# Patient Record
Sex: FEMALE | Race: OTHER | NOT HISPANIC OR LATINO | URBAN - METROPOLITAN AREA
[De-identification: names, ages, dates, MRNs, and addresses within clinical notes are randomized per-mention and may not be internally consistent; named-entity substitution may affect disease eponyms.]

---

## 2021-03-12 ENCOUNTER — EMERGENCY (EMERGENCY)
Facility: HOSPITAL | Age: 61
LOS: 1 days | Discharge: HOME | End: 2021-03-12
Attending: EMERGENCY MEDICINE
Payer: MEDICAID

## 2021-03-12 VITALS
WEIGHT: 187.39 LBS | HEART RATE: 80 BPM | RESPIRATION RATE: 20 BRPM | HEIGHT: 64.96 IN | TEMPERATURE: 98 F | DIASTOLIC BLOOD PRESSURE: 81 MMHG | OXYGEN SATURATION: 97 % | SYSTOLIC BLOOD PRESSURE: 128 MMHG

## 2021-03-12 VITALS
RESPIRATION RATE: 18 BRPM | TEMPERATURE: 99 F | SYSTOLIC BLOOD PRESSURE: 130 MMHG | DIASTOLIC BLOOD PRESSURE: 85 MMHG | OXYGEN SATURATION: 100 % | HEART RATE: 57 BPM

## 2021-03-12 DIAGNOSIS — R06.02 SHORTNESS OF BREATH: ICD-10-CM

## 2021-03-12 LAB
ALBUMIN SERPL ELPH-MCNC: 4.5 G/DL — SIGNIFICANT CHANGE UP (ref 3.3–5)
ALP SERPL-CCNC: 69 U/L — SIGNIFICANT CHANGE UP (ref 40–120)
ALT FLD-CCNC: 12 U/L — SIGNIFICANT CHANGE UP (ref 10–45)
ANION GAP SERPL CALC-SCNC: 10 MMOL/L — SIGNIFICANT CHANGE UP (ref 5–17)
AST SERPL-CCNC: 19 U/L — SIGNIFICANT CHANGE UP (ref 10–40)
BASOPHILS # BLD AUTO: 0.05 K/UL — SIGNIFICANT CHANGE UP (ref 0–0.2)
BASOPHILS NFR BLD AUTO: 1.1 % — SIGNIFICANT CHANGE UP (ref 0–2)
BILIRUB SERPL-MCNC: 0.8 MG/DL — SIGNIFICANT CHANGE UP (ref 0.2–1.2)
BUN SERPL-MCNC: 13 MG/DL — SIGNIFICANT CHANGE UP (ref 7–23)
CALCIUM SERPL-MCNC: 9.8 MG/DL — SIGNIFICANT CHANGE UP (ref 8.4–10.5)
CHLORIDE SERPL-SCNC: 103 MMOL/L — SIGNIFICANT CHANGE UP (ref 96–108)
CO2 SERPL-SCNC: 26 MMOL/L — SIGNIFICANT CHANGE UP (ref 22–31)
CREAT SERPL-MCNC: 0.77 MG/DL — SIGNIFICANT CHANGE UP (ref 0.5–1.3)
D DIMER BLD IA.RAPID-MCNC: <150 NG/ML DDU — SIGNIFICANT CHANGE UP
EOSINOPHIL # BLD AUTO: 0.12 K/UL — SIGNIFICANT CHANGE UP (ref 0–0.5)
EOSINOPHIL NFR BLD AUTO: 2.5 % — SIGNIFICANT CHANGE UP (ref 0–6)
GLUCOSE SERPL-MCNC: 112 MG/DL — HIGH (ref 70–99)
HCT VFR BLD CALC: 41.3 % — SIGNIFICANT CHANGE UP (ref 34.5–45)
HGB BLD-MCNC: 13.4 G/DL — SIGNIFICANT CHANGE UP (ref 11.5–15.5)
LYMPHOCYTES # BLD AUTO: 1.7 K/UL — SIGNIFICANT CHANGE UP (ref 1–3.3)
LYMPHOCYTES # BLD AUTO: 35.8 % — SIGNIFICANT CHANGE UP (ref 13–44)
MCHC RBC-ENTMCNC: 29.1 PG — SIGNIFICANT CHANGE UP (ref 27–34)
MCHC RBC-ENTMCNC: 32.4 GM/DL — SIGNIFICANT CHANGE UP (ref 32–36)
MCV RBC AUTO: 89.8 FL — SIGNIFICANT CHANGE UP (ref 80–100)
MONOCYTES # BLD AUTO: 0.37 K/UL — SIGNIFICANT CHANGE UP (ref 0–0.9)
MONOCYTES NFR BLD AUTO: 7.8 % — SIGNIFICANT CHANGE UP (ref 2–14)
NEUTROPHILS # BLD AUTO: 2.51 K/UL — SIGNIFICANT CHANGE UP (ref 1.8–7.4)
NEUTROPHILS NFR BLD AUTO: 52.8 % — SIGNIFICANT CHANGE UP (ref 43–77)
NRBC # BLD: 0 /100 WBCS — SIGNIFICANT CHANGE UP (ref 0–0)
PLATELET # BLD AUTO: 286 K/UL — SIGNIFICANT CHANGE UP (ref 150–400)
POTASSIUM SERPL-MCNC: 4.3 MMOL/L — SIGNIFICANT CHANGE UP (ref 3.5–5.3)
POTASSIUM SERPL-SCNC: 4.3 MMOL/L — SIGNIFICANT CHANGE UP (ref 3.5–5.3)
PROT SERPL-MCNC: 7.4 G/DL — SIGNIFICANT CHANGE UP (ref 6–8.3)
RBC # BLD: 4.6 M/UL — SIGNIFICANT CHANGE UP (ref 3.8–5.2)
RBC # FLD: 12.3 % — SIGNIFICANT CHANGE UP (ref 10.3–14.5)
SARS-COV-2 RNA SPEC QL NAA+PROBE: DETECTED
SODIUM SERPL-SCNC: 139 MMOL/L — SIGNIFICANT CHANGE UP (ref 135–145)
TROPONIN T, HIGH SENSITIVITY RESULT: <6 NG/L — SIGNIFICANT CHANGE UP (ref 0–51)
WBC # BLD: 4.75 K/UL — SIGNIFICANT CHANGE UP (ref 3.8–10.5)
WBC # FLD AUTO: 4.75 K/UL — SIGNIFICANT CHANGE UP (ref 3.8–10.5)

## 2021-03-12 PROCEDURE — U0003: CPT

## 2021-03-12 PROCEDURE — 85379 FIBRIN DEGRADATION QUANT: CPT

## 2021-03-12 PROCEDURE — G1004: CPT

## 2021-03-12 PROCEDURE — 93005 ELECTROCARDIOGRAM TRACING: CPT

## 2021-03-12 PROCEDURE — 71045 X-RAY EXAM CHEST 1 VIEW: CPT | Mod: 26

## 2021-03-12 PROCEDURE — 71275 CT ANGIOGRAPHY CHEST: CPT

## 2021-03-12 PROCEDURE — 99284 EMERGENCY DEPT VISIT MOD MDM: CPT | Mod: 25

## 2021-03-12 PROCEDURE — 93010 ELECTROCARDIOGRAM REPORT: CPT

## 2021-03-12 PROCEDURE — 36000 PLACE NEEDLE IN VEIN: CPT | Mod: XU

## 2021-03-12 PROCEDURE — 71275 CT ANGIOGRAPHY CHEST: CPT | Mod: 26,ME

## 2021-03-12 PROCEDURE — 85025 COMPLETE CBC W/AUTO DIFF WBC: CPT

## 2021-03-12 PROCEDURE — 71045 X-RAY EXAM CHEST 1 VIEW: CPT

## 2021-03-12 PROCEDURE — 84484 ASSAY OF TROPONIN QUANT: CPT

## 2021-03-12 PROCEDURE — 99285 EMERGENCY DEPT VISIT HI MDM: CPT

## 2021-03-12 PROCEDURE — 80053 COMPREHEN METABOLIC PANEL: CPT

## 2021-03-12 PROCEDURE — U0005: CPT

## 2021-03-12 NOTE — CONSULT NOTE ADULT - SUBJECTIVE AND OBJECTIVE BOX
CHIEF COMPLAINT:    HISTORY OF PRESENT ILLNESS: 59 yo female  presents with translation by her doctor, Dr. Pedraza.  Patient reports suspected Covid infection in January and a persistent mild sob of for the last 2 months.  This occurs at rest and on exertion without change.  Patient also reports chest discomfort during this time.  No fever, no n/v, no cough.  Patient without any leg pain or complaints.  No bruising or bleeding    PAST MEDICAL & SURGICAL HISTORY:  No pertinent past medical history    No significant past surgical history            MEDICATIONS:                  FAMILY HISTORY:      SOCIAL HISTORY:    [ ] Non-smoker  [ ] Smoker  [ ] Alcohol    Allergies    cephalosporins (Rash)    Intolerances    	    REVIEW OF SYSTEMS:  CONSTITUTIONAL: No fever, weight loss, + fatigue  EYES: No eye pain, visual disturbances, or discharge  ENMT:  No difficulty hearing, tinnitus, vertigo; No sinus or throat pain  NECK: No pain or stiffness  RESPIRATORY: No cough, wheezing, chills or hemoptysis; + Shortness of Breath  CARDIOVASCULAR: No chest pain, palpitations, passing out, dizziness, or leg swelling  GASTROINTESTINAL: No abdominal or epigastric pain. No nausea, vomiting, or hematemesis; No diarrhea or constipation. No melena or hematochezia.  GENITOURINARY: No dysuria, frequency, hematuria, or incontinence  NEUROLOGICAL: No headaches, memory loss, loss of strength, numbness, or tremors  SKIN: No itching, burning, rashes, or lesions   LYMPH Nodes: No enlarged glands  ENDOCRINE: No heat or cold intolerance; No hair loss  MUSCULOSKELETAL: No joint pain or swelling; No muscle, back, or extremity pain  PSYCHIATRIC: No depression, anxiety, mood swings, or difficulty sleeping  HEME/LYMPH: No easy bruising, or bleeding gums  ALLERY AND IMMUNOLOGIC: No hives or eczema	    [ ] All others negative	  [ ] Unable to obtain    PHYSICAL EXAM:  T(C): 36.9 (03-12-21 @ 09:18), Max: 36.9 (03-12-21 @ 09:18)  HR: 80 (03-12-21 @ 09:18) (80 - 80)  BP: 128/81 (03-12-21 @ 09:18) (128/81 - 128/81)  RR: 20 (03-12-21 @ 09:18) (20 - 20)  SpO2: 97% (03-12-21 @ 09:18) (97% - 97%)  Wt(kg): --  I&O's Summary      Appearance: Normal	  HEENT:   Normal oral mucosa, PERRL, EOMI	  Lymphatic: No lymphadenopathy  Cardiovascular: Normal S1 S2, No JVD, No murmurs, No edema  Respiratory: Lungs clear to auscultation	  Psychiatry: A & O x 3, Mood & affect appropriate  Gastrointestinal:  Soft, Non-tender, + BS	  Skin: No rashes, No ecchymoses, No cyanosis	  Neurologic: Non-focal  Extremities: Normal range of motion, No clubbing, cyanosis or edema  Vascular: Peripheral pulses palpable 2+ bilaterally    TELEMETRY: 	 SR   ECG:  	NSR no acute ischemic stt changes    RADIOLOGY:  x< from: Xray Chest 1 View- PORTABLE-Urgent (03.12.21 @ 10:34) >  EXAM:  XR CHEST PORTABLE URGENT 1V                            PROCEDURE DATE:  03/12/2021            INTERPRETATION:  HISTORY: Chest pain and shortness of breath.    TECHNIQUE: A single AP view of the chest was obtained.    COMPARISON: None.    FINDINGS:  The cardiac silhouette is normal in size. There are no focal consolidations or pleural effusions. The hilar and mediastinal structures appear unremarkable. The osseous structures are intact.    IMPRESSION: Clear lungs.      < end of copied text >    OTHER: 	  	  LABS:	 	    CARDIAC MARKERS:        Troponin T, High Sensitivity Result: <6: Specimen not hemolyzed                           13.4   4.75  )-----------( 286      ( 12 Mar 2021 09:58 )             41.3     03-12    139  |  103  |  13  ----------------------------<  112<H>  4.3   |  26  |  0.77    Ca    9.8      12 Mar 2021 09:58    TPro  7.4  /  Alb  4.5  /  TBili  0.8  /  DBili  x   /  AST  19  /  ALT  12  /  AlkPhos  69  03-12    proBNP:   Lipid Profile:   HgA1c:   TSH:

## 2021-03-12 NOTE — ED PROVIDER NOTE - NS ED ROS FT
Review of Systems:  · Constitutional: no chills, no fever, no night sweats, no weight loss  · Nose: no epitaxis  · Mouth/Throat: no difficulty in swallowing, trachea midline, uvula midline  Cardiac:  Chest discomfort for 2 months with mild sob  · Respiratory: no cough,  no hemoptysis, no orthopnea,   · Gastrointestinal: no abdominal pain, no diarrhea, no melena, no nausea, no vomiting  · Genitourinary: no difficulty urinating, no dysuria, no hematuria  · MUSCULOSKELETAL: FROM of all extremities  · Skin: no abrasion; no bruising; no laceration  · Neurological: no change in level of consciousness, no headache, no seizures  · Psychiatric: no anxiety, no depression  · Endocrine: no excessive urination  · Heme/Lymph: no anemia, no easy bleeding  · Allergic/Immunologic: IMMUNIZATIONS UTD  · ROS STATEMENT: all other ROS negative except as per HPI

## 2021-03-12 NOTE — ED PROVIDER NOTE - CLINICAL SUMMARY MEDICAL DECISION MAKING FREE TEXT BOX
Patient presents with 2 months of mild SOB at rest and exertion.  Likely occurring or starting at time of a non-documented, suspected Covid infection.  Possible blood clot due to hypercoaguable state of Covid.  Will also evaluate ekg, troponin, labs, cxr, CTA.  Patient currently stable without pain, normal vital signs and patient is comfortable. Patient presents with 2 months of mild SOB at rest and exertion.  Likely occurring or starting at time of a non-documented, suspected Covid infection.  Possible blood clot due to hypercoaguable state of Covid.  Will also evaluate ekg, troponin, labs, cxr, CTA.  Patient currently stable without pain, normal vital signs and patient is comfortable.    Jarred:  I received a call from the cardiology fellow regarding the patient reporting that he is unable to perform a nuclear stress test on the patient today.  This is odd because I did not consult cardiology nor do I believe that a cardiac consult is indicated.  I saw an order in this chart for a nuclear stress test that was ordered by Dr. Jack.  I called Dr. Jack to his cell phone and office number (message left) to understand why a nuclear stress was ordered without a consult.  Will also discuss with Dr. Pedraza who brought the patient to the ED. Patient presents with 2 months of mild SOB at rest and exertion.  Likely occurring or starting at time of a non-documented, suspected Covid infection.  Possible blood clot due to hypercoaguable state of Covid.  Will also evaluate ekg, troponin, labs, cxr, CTA.  Patient currently stable without pain, normal vital signs and patient is comfortable.    Jarred:  I received a call from the cardiology fellow regarding the patient reporting that he is unable to perform a nuclear stress test on the patient today.  This is odd because I did not consult cardiology nor do I believe that a cardiac consult is indicated.  I saw an order in this chart for a nuclear stress test that was ordered by Dr. Jack.  I called Dr. Jack to his cell phone and office number (message left) to understand why a nuclear stress was ordered without a consult.  Will also discuss with Dr. Pedraza who brought the patient to the ED.    Patient labs and CT wnl.  Patient stable for outpatient f/u with PMD and cardiology if indicated.  Patient currently asymptomatic.

## 2021-03-12 NOTE — CHART NOTE - NSCHARTNOTEFT_GEN_A_CORE
EMERGENCY : LMSW consulted by patient's primary care physician at bedside due to patient being undocumented/ uninsured and having concerns about costs of hospital stay. LMSW met with patient and patients PCP at bedside and introduced self and role to which they verbalized understanding. Patient primarily Afghan speaking with PCP at bedside providing translation. Patient sees her primary care physician Dr. Terrence antonio. She verbalizes concerns for costs of hospital/ED stay. LMSW provided education on the availability of the financial assistance department to assist patient with medicaid application. LMSW also provided education on the availability of sliding scale clinics and pricing out of meds at [facility] if patient needs any medications upon d/c, but patients dispo is still pending ED course. LMSW connected patient and PCP with Cesar from [employer] who is going to complete emergency medicaid application for patient for coverage of costs of hospital stays. LMSW also ensured ongoing social work availability should patient have any further needs or financial concerns upon discharge in terms of follow up care. Social work remains available for any further needs.

## 2021-03-12 NOTE — ED PROVIDER NOTE - PHYSICAL EXAMINATION
· Physical Examination:  · CONSTITUTIONAL:  Appearance: well appearing.  Development: well developed.  Distress: no apparent  · Manner: appropriate for situation.  Mentation: awake, alert, oriented to person, place, time/situation  · Mood: appropriate.  Nourishment: well  · Head Shape: normal cephalic, ATRAUMATIC  · EYES: bilateral normal, no discharge, redness or evidence of any abnormality  · Nose: clear Mouth: normal mucosa  · Throat: uvula midline, no vesicles, no redness, and no oropharyngeal exudate.  · CARDIAC:  CARDIAC RHYTHM: regular  CARDIAC RATE: normal  CARDIAC PEDAL EDEMA: absent  CARDIAC JVD: non-distended bilaterally  · CARDIAC PULSES: normal bilaterally  · RESPIRATORY:  Respiratory Distress: no  Breath Sounds: normal  · Chest Exam: normal, non-tender  · Abdominal Exam: soft, nondistended, nontender  · MUSCULOSKELETAL: Spine appears normal, range of motion is not limited, no muscle or joint tenderness  · NEUROLOGICAL: Alert and oriented, no focal deficits, no motor or sensory deficits.  · SKIN: Skin normal color for race, warm, dry and intact. No evidence of rash.  · PSYCHIATRIC: Alert and oriented to person, place, time/situation. normal mood and affect. no apparent risk to self or others.  · HEME LYMPH: No adenopathy or splenomegaly. No cervical or inguinal lymphadenopathy.

## 2021-03-12 NOTE — ED ADULT NURSE NOTE - NS ED NURSE DISCH DISPOSITION
What Is The Reason For Today's Visit?: History of Melanoma
Breslow Depth?: 0.43
Year Excised?: 2016
Discharged

## 2021-03-12 NOTE — ED PROVIDER NOTE - OBJECTIVE STATEMENT
Patient presents with translation by her doctor, Dr. Pedraza.  Patient reports suspected Covid infection in January and a persistent mild sob of for the last 2 months.  This occurs at rest and on exertion without change.  Patient also reports chest discomfort during this time.  No fever, no n/v, no cough.  Patient without any leg pain or complaints.  No bruising or bleeding.

## 2021-03-12 NOTE — ED ADULT TRIAGE NOTE - CHIEF COMPLAINT QUOTE
GUTIERREZ, dizziness, memory loss, chest pain since Jan (COVID +) worsening symptoms over the last week  denies fevers, cough

## 2021-03-12 NOTE — ED PROVIDER NOTE - PATIENT PORTAL LINK FT
You can access the FollowMyHealth Patient Portal offered by [facility] by registering at the following website: [email or web address]. By joining [facility] FollowMyHealth portal, you will also be able to view your health information using other applications (apps) compatible with our system.

## 2021-03-12 NOTE — ED PROVIDER NOTE - INTERPRETATION
NSR/normal sinus rhythm, Normal axis, Normal VA interval and QRS complex. There are no acute ischemic ST or T-wave changes.

## 2022-03-30 NOTE — ED PROVIDER NOTE - NSTIMEPROVIDERCAREINITIATE_GEN_ER
12-Mar-2021 09:27 Fusiform Excision Additional Text (Leave Blank If You Do Not Want): The margin was drawn around the clinically apparent lesion.  A fusiform shape was then drawn on the skin incorporating the lesion and margins.  Incisions were then made along these lines to the appropriate tissue plane and the lesion was extirpated.

## 2023-09-18 NOTE — CONSULT NOTE ADULT - ASSESSMENT
Called patient rescheduled appointment   61 yo female  presents with translation by her doctor, Dr. Pedraza.  Patient reports suspected Covid infection in January and a persistent mild sob of for the last 2 months.  This occurs at rest and on exertion without change.  Patient also reports chest discomfort during this time.  No fever, no n/v, no cough.  Patient without any leg pain or complaints.  No bruising or bleeding